# Patient Record
Sex: MALE | Race: ASIAN | NOT HISPANIC OR LATINO | URBAN - METROPOLITAN AREA
[De-identification: names, ages, dates, MRNs, and addresses within clinical notes are randomized per-mention and may not be internally consistent; named-entity substitution may affect disease eponyms.]

---

## 2023-09-08 ENCOUNTER — EMERGENCY (EMERGENCY)
Facility: HOSPITAL | Age: 38
LOS: 1 days | Discharge: HOME | End: 2023-09-08
Attending: EMERGENCY MEDICINE | Admitting: EMERGENCY MEDICINE
Payer: SELF-PAY

## 2023-09-08 VITALS
SYSTOLIC BLOOD PRESSURE: 135 MMHG | HEART RATE: 85 BPM | HEIGHT: 69 IN | WEIGHT: 164.91 LBS | OXYGEN SATURATION: 97 % | TEMPERATURE: 97 F | DIASTOLIC BLOOD PRESSURE: 84 MMHG | RESPIRATION RATE: 19 BRPM

## 2023-09-08 PROCEDURE — 99053 MED SERV 10PM-8AM 24 HR FAC: CPT

## 2023-09-08 PROCEDURE — 99282 EMERGENCY DEPT VISIT SF MDM: CPT

## 2023-09-08 PROCEDURE — 99283 EMERGENCY DEPT VISIT LOW MDM: CPT

## 2023-09-08 NOTE — ED PROVIDER NOTE - OBJECTIVE STATEMENT
36 yo M presents c/o swelling to glans area noticed today. States he was kneed in the groin area during jiu-jiutsu yesterday. Denies pain or difficulty urinating. Denies hematuria. Denies scrotal pain. Denies penile pain.
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
## 2023-09-08 NOTE — ED PROVIDER NOTE - NSFOLLOWUPINSTRUCTIONS_ED_ALL_ED_FT
Follow up with the Urologist next week. Return to the ER if symptoms worsen or if you have difficulty urinating.     Wear supportive briefs. Apply cold compress 5 to 10 mins at a time. Take ibuprofen as needed 600mg     Acute Paraphimosis    WHAT YOU NEED TO KNOW:    What is acute paraphimosis? Acute paraphimosis is abnormal tightness of the foreskin. The foreskin covers the glans (tip) of the penis. The foreskin can be pulled back onto the penis to uncover the tip. Acute paraphimosis prevents your foreskin from being pushed back over the tip.  Male Reproductive System    What causes acute paraphimosis? Any activity or procedure that causes the foreskin to be pulled back can cause paraphimosis. The foreskin may be pulled back when you clean the tip, or during sexual activity.    What increases my risk for cute paraphimosis?    Being born with a tight opening of the foreskin    Poor hygiene    Infection    Scarring of the penis caused by injury  What are the signs and symptoms of acute paraphimosis?    Swollen tip and shaft    Bluish or dark tip    Pain in your penis    Pain when you urinate, or problems urinating  How is acute paraphimosis diagnosed? Your healthcare provider will ask about your symptoms and examine you. Tell the provider when your symptoms started and how long you have had them. Also tell the provider if you have had an infection or injury to your penis, or used a catheter.    How is acute paraphimosis treated? Acute paraphimosis may go away on its own. The swelling in your penis should decrease after your foreskin has returned to its normal position. You may need the following treatments if your foreskin does not return to its normal position:    Medicines may help decrease pain or swelling.    An ice pack may be placed on the foreskin and tip for 5 to 10 minutes to decrease inflammation.    Tight pressure may be needed for a short period of time. This will help decrease inflammation. Healthcare providers may wrap your penis with a bandage for 5 to 10 minutes. A bandage with numbing medicine may be used.    Procedures may be needed to move your foreskin back into position over the tip. Another procedure may be needed to decrease severe swelling.    Surgery may be needed if other treatments do not work. During surgery, the foreskin is placed in the correct position so pressure and swelling are relieved. You may need a circumcision after this procedure because cutting the foreskin will change how your penis looks.  How can I manage my acute paraphimosis?    Do not have sex until your healthcare provider says it is okay. Do not have any sexual activity for 7 to 10 days, to allow the penis to heal. Sexual activity includes intercourse and masturbation. Ask when you can go back to your usual sexual activities.    Keep your penis clean. Clean your penis every day by removing the smegma around the tip. Ask for more information about foreskin care.    Gently move your foreskin back to the normal position. Every time your foreskin is pulled back, make sure it returns to its original position. The foreskin must always cover the tip. Do not force the foreskin back over the tip. Force can cause scars to form on the penis.    Do not use penile rings. Penile rings can cause swelling and infection.  When should I seek immediate care?    You have sudden pain or swelling in your penis.    You lose feeling in your penis.    You have an open wound on your penis.  When should I call my doctor?    Your signs and symptoms return or worsen.    You have pain during sexual activities.    You have questions or concerns about your condition or care.  CARE AGREEMENT:    You have the right to help plan your care. Learn about your health condition and how it may be treated. Discuss treatment options with your healthcare providers to decide what care you want to receive. You always have the right to refuse treatment.

## 2023-09-08 NOTE — ED PROVIDER NOTE - PATIENT PORTAL LINK FT
You can access the FollowMyHealth Patient Portal offered by [facility] by registering at the following website: [email or web address]. By joining [employer]’s FollowMyHealth portal, you will also be able to view your health information using other applications (apps) compatible with our system.
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
## 2023-09-08 NOTE — ED PROVIDER NOTE - CARE PROVIDER_API CALL
Bartolo Arenas  Urology  [address], [address]  [place] [address]  Phone: [phone]  Fax: [phone]  Follow Up Time: 1-3 Days

## 2023-09-08 NOTE — ED PROVIDER NOTE - PENIS
mild swelling of the foreskin, no ecchymosis, no tenderness to palpation. Exam was chaperones by SHEILA Sweeney.